# Patient Record
Sex: MALE | Race: OTHER | NOT HISPANIC OR LATINO
[De-identification: names, ages, dates, MRNs, and addresses within clinical notes are randomized per-mention and may not be internally consistent; named-entity substitution may affect disease eponyms.]

---

## 2018-05-07 ENCOUNTER — APPOINTMENT (OUTPATIENT)
Dept: UROLOGY | Facility: CLINIC | Age: 48
End: 2018-05-07
Payer: COMMERCIAL

## 2018-05-07 VITALS
DIASTOLIC BLOOD PRESSURE: 105 MMHG | SYSTOLIC BLOOD PRESSURE: 159 MMHG | RESPIRATION RATE: 17 BRPM | HEIGHT: 73 IN | WEIGHT: 240 LBS | BODY MASS INDEX: 31.81 KG/M2 | HEART RATE: 94 BPM

## 2018-05-07 PROCEDURE — 99213 OFFICE O/P EST LOW 20 MIN: CPT

## 2018-05-08 LAB
APPEARANCE: CLEAR
BACTERIA: NEGATIVE
BILIRUBIN URINE: NEGATIVE
BLOOD URINE: ABNORMAL
COLOR: YELLOW
GLUCOSE QUALITATIVE U: NEGATIVE MG/DL
HYALINE CASTS: 7 /LPF
KETONES URINE: NEGATIVE
LEUKOCYTE ESTERASE URINE: NEGATIVE
MICROSCOPIC-UA: NORMAL
NITRITE URINE: NEGATIVE
PH URINE: 5.5
PROTEIN URINE: 300 MG/DL
RED BLOOD CELLS URINE: 2 /HPF
SPECIFIC GRAVITY URINE: 1.03
SQUAMOUS EPITHELIAL CELLS: 3 /HPF
UROBILINOGEN URINE: NEGATIVE MG/DL
WHITE BLOOD CELLS URINE: 3 /HPF

## 2018-05-18 ENCOUNTER — FORM ENCOUNTER (OUTPATIENT)
Age: 48
End: 2018-05-18

## 2018-05-19 ENCOUNTER — OUTPATIENT (OUTPATIENT)
Dept: OUTPATIENT SERVICES | Facility: HOSPITAL | Age: 48
LOS: 1 days | End: 2018-05-19
Payer: COMMERCIAL

## 2018-05-19 ENCOUNTER — APPOINTMENT (OUTPATIENT)
Dept: ULTRASOUND IMAGING | Facility: IMAGING CENTER | Age: 48
End: 2018-05-19
Payer: COMMERCIAL

## 2018-05-19 DIAGNOSIS — Z00.00 ENCOUNTER FOR GENERAL ADULT MEDICAL EXAMINATION WITHOUT ABNORMAL FINDINGS: ICD-10-CM

## 2018-05-19 DIAGNOSIS — N20.0 CALCULUS OF KIDNEY: ICD-10-CM

## 2018-05-19 DIAGNOSIS — N23 UNSPECIFIED RENAL COLIC: ICD-10-CM

## 2018-05-19 PROCEDURE — 76770 US EXAM ABDO BACK WALL COMP: CPT | Mod: 26

## 2018-05-19 PROCEDURE — 76770 US EXAM ABDO BACK WALL COMP: CPT

## 2020-03-12 ENCOUNTER — OUTPATIENT (OUTPATIENT)
Dept: OUTPATIENT SERVICES | Facility: HOSPITAL | Age: 50
LOS: 1 days | End: 2020-03-12
Payer: SELF-PAY

## 2020-03-12 ENCOUNTER — APPOINTMENT (OUTPATIENT)
Dept: UROLOGY | Facility: CLINIC | Age: 50
End: 2020-03-12
Payer: SELF-PAY

## 2020-03-12 VITALS
SYSTOLIC BLOOD PRESSURE: 188 MMHG | HEART RATE: 98 BPM | DIASTOLIC BLOOD PRESSURE: 109 MMHG | TEMPERATURE: 98 F | RESPIRATION RATE: 16 BRPM

## 2020-03-12 DIAGNOSIS — N52.9 MALE ERECTILE DYSFUNCTION, UNSPECIFIED: ICD-10-CM

## 2020-03-12 DIAGNOSIS — Z00.00 ENCOUNTER FOR GENERAL ADULT MEDICAL EXAMINATION W/OUT ABNORMAL FINDINGS: ICD-10-CM

## 2020-03-12 DIAGNOSIS — N20.0 CALCULUS OF KIDNEY: ICD-10-CM

## 2020-03-12 DIAGNOSIS — R80.1 PERSISTENT PROTEINURIA, UNSPECIFIED: ICD-10-CM

## 2020-03-12 DIAGNOSIS — N23 UNSPECIFIED RENAL COLIC: ICD-10-CM

## 2020-03-12 LAB
ANION GAP SERPL CALC-SCNC: 15 MMOL/L
APPEARANCE: CLEAR
BACTERIA: NEGATIVE
BILIRUBIN URINE: NEGATIVE
BLOOD URINE: ABNORMAL
BUN SERPL-MCNC: 17 MG/DL
CALCIUM OXALATE CRYSTALS: ABNORMAL
CALCIUM SERPL-MCNC: 10.1 MG/DL
CHLORIDE SERPL-SCNC: 100 MMOL/L
CO2 SERPL-SCNC: 26 MMOL/L
COLOR: YELLOW
CREAT SERPL-MCNC: 1 MG/DL
GLUCOSE QUALITATIVE U: NEGATIVE
GLUCOSE SERPL-MCNC: 135 MG/DL
HYALINE CASTS: 1 /LPF
KETONES URINE: NEGATIVE
LEUKOCYTE ESTERASE URINE: NEGATIVE
MICROSCOPIC-UA: NORMAL
NITRITE URINE: NEGATIVE
PH URINE: 6
POTASSIUM SERPL-SCNC: 5 MMOL/L
PROTEIN URINE: ABNORMAL
PSA FREE FLD-MCNC: 23 %
PSA FREE SERPL-MCNC: 0.22 NG/ML
PSA SERPL-MCNC: 0.93 NG/ML
RED BLOOD CELLS URINE: 1 /HPF
SODIUM SERPL-SCNC: 141 MMOL/L
SPECIFIC GRAVITY URINE: 1.02
SQUAMOUS EPITHELIAL CELLS: 1 /HPF
URINE COMMENTS: NORMAL
UROBILINOGEN URINE: NORMAL
WHITE BLOOD CELLS URINE: 2 /HPF

## 2020-03-12 PROCEDURE — 76775 US EXAM ABDO BACK WALL LIM: CPT

## 2020-03-12 PROCEDURE — 76775 US EXAM ABDO BACK WALL LIM: CPT | Mod: 26

## 2020-03-12 PROCEDURE — 99214 OFFICE O/P EST MOD 30 MIN: CPT | Mod: 25

## 2020-03-13 NOTE — LETTER BODY
[FreeTextEntry1] : Ronnie Alcantara MD\par [address] [place] [address],[place] [address]\par [phone] \par [phone]\par \par Dear Dr. Alcantara,\par \par Reason for Visit: Uric acid kidney stones. Left flank pain.\par \par This is a 49 year-old gentleman with history of uric acid kidney stones. His previous renal ultrasound demonstrated a mild left renal fullness without evidence of renal calculi. His CT scan in 2016 demonstrated no residual renal or ureteral calculi. The patient returns today for follow up, last seen 2 years ago. Since his last visit, reports left flank pain. He continues to take potassium citrate as directed without any difficulties. All other review of systems were negative. The past medical history, family history and social history are unchanged. All other review of systems are negative. Patient denies any changes in medications. Medication list was reconciled.\par \par On examination, the patient is a healthy-appearing gentleman in no acute distress. He is alert and oriented follows commands. He has normal mood and affect. He is normocephalic. Neck is supple. Respirations are unlabored. His abdomen is soft and nontender. Bladder is nonpalpable. No CVA tenderness. Neurologically he is grossly intact. No peripheral edema. Skin without gross abnormality.\par \par His urinalysis in 2018 demonstrated persistent proteinuria, 300 mg/dL.\par \par I personally reviewed ultrasound images with the patient today. Images demonstrated both kidneys are normal in size and echogenicity without obvious stones,  hydronephrosis or solid masses visualized. \par \par Assessment: Uric acid stones. Proteinuria.\par \par I counseled the patient. Patient's ultrasound today was unremarkable for any stones. I reassured the patient as his stones have not recurred. Given his history of uric acid stones, I asked the he continue taking potassium citrate regularly. In terms of his proteinuria, I recommend he follow up obtain 24 hour urinalysis to evaluate protein levels. Patient understands that if he develops gross hematuria or any urinary discomfort, he will contact me for further evaluation. Risks and alternatives were discussed. I answered the patient's questions. The patient will follow up as directed and will contact me with any questions or concerns. Thank you for the opportunity to participate in the care of this patient. I'll keep you updated on his progress.\par \par Plan: Continue potassium citrate. 24 hour urine, protein total. Follow up in 1 year.

## 2020-03-13 NOTE — ADDENDUM
[FreeTextEntry1] : Entered by Jayesh Reyna, acting as scribe for Dr. Jamar Bernard.\par \par The documentation recorded by the scribe accurately reflects the service I personally performed and the decisions made by me.

## 2020-03-13 NOTE — LETTER BODY
[FreeTextEntry1] : Ronnie Alcantara MD\par [address] [place] [address],[place] [address]\par [phone] \par [phone]\par \par Dear Dr. Alcantara,\par \par Reason for Visit: Uric acid kidney stones. Left flank pain.\par \par This is a 49 year-old gentleman with history of uric acid kidney stones. His previous renal ultrasound demonstrated a mild left renal fullness without evidence of renal calculi. His CT scan in 2016 demonstrated no residual renal or ureteral calculi. The patient returns today for follow up, last seen 2 years ago. Since his last visit, reports left flank pain. He continues to take potassium citrate as directed without any difficulties. All other review of systems were negative. The past medical history, family history and social history are unchanged. All other review of systems are negative. Patient denies any changes in medications. Medication list was reconciled.\par \par On examination, the patient is a healthy-appearing gentleman in no acute distress. He is alert and oriented follows commands. He has normal mood and affect. He is normocephalic. Neck is supple. Respirations are unlabored. His abdomen is soft and nontender. Bladder is nonpalpable. No CVA tenderness. Neurologically he is grossly intact. No peripheral edema. Skin without gross abnormality.\par \par His urinalysis in 2018 demonstrated persistent proteinuria, 300 mg/dL.\par \par I personally reviewed ultrasound images with the patient today. Images demonstrated both kidneys are normal in size and echogenicity without obvious stones,  hydronephrosis or solid masses visualized. \par \par Assessment: Uric acid stones. Proteinuria.\par \par I counseled the patient. Patient's ultrasound today was unremarkable for any stones. I reassured the patient as his stones have not recurred. Given his history of uric acid stones, I asked the he continue taking potassium citrate regularly. In terms of his proteinuria, I recommend he follow up obtain 24 hour urinalysis to evaluate protein levels. Patient understands that if he develops gross hematuria or any urinary discomfort, he will contact me for further evaluation. Risks and alternatives were discussed. I answered the patient's questions. The patient will follow up as directed and will contact me with any questions or concerns. Thank you for the opportunity to participate in the care of this patient. I'll keep you updated on his progress.\par \par Plan: Continue potassium citrate. 24 hour urine, protein total. Follow up in 1 year.

## 2020-03-13 NOTE — LETTER BODY
[FreeTextEntry1] : Ronnie Alcantara MD\par [address] [place] [address],[place] [address]\par [phone] \par [phone]\par \par Dear Dr. Alcantara,\par \par Reason for Visit: Uric acid kidney stones. Left flank pain.\par \par This is a 49 year-old gentleman with history of uric acid kidney stones. His previous renal ultrasound demonstrated a mild left renal fullness without evidence of renal calculi. His CT scan in 2016 demonstrated no residual renal or ureteral calculi. The patient returns today for follow up, last seen 2 years ago. Since his last visit, reports left flank pain. He continues to take potassium citrate as directed without any difficulties. All other review of systems were negative. The past medical history, family history and social history are unchanged. All other review of systems are negative. Patient denies any changes in medications. Medication list was reconciled.\par \par On examination, the patient is a healthy-appearing gentleman in no acute distress. He is alert and oriented follows commands. He has normal mood and affect. He is normocephalic. Neck is supple. Respirations are unlabored. His abdomen is soft and nontender. Bladder is nonpalpable. No CVA tenderness. Neurologically he is grossly intact. No peripheral edema. Skin without gross abnormality.\par \par His urinalysis in 2018 demonstrated persistent proteinuria, 300 mg/dL.\par \par I personally reviewed ultrasound images with the patient today. Images demonstrated both kidneys are normal in size and echogenicity without obvious stones,  hydronephrosis or solid masses visualized. \par \par Assessment: Uric acid stones. Proteinuria.\par \par I counseled the patient. Patient's ultrasound today was unremarkable for any stones. I reassured the patient as his stones have not recurred. Given his history of uric acid stones, I asked the he continue taking potassium citrate regularly. In terms of his proteinuria, I recommend he follow up obtain 24 hour urinalysis to evaluate protein levels. Patient understands that if he develops gross hematuria or any urinary discomfort, he will contact me for further evaluation. Risks and alternatives were discussed. I answered the patient's questions. The patient will follow up as directed and will contact me with any questions or concerns. Thank you for the opportunity to participate in the care of this patient. I'll keep you updated on his progress.\par \par Plan: Continue potassium citrate. 24 hour urine, protein total. Follow up in 1 year.

## 2020-03-19 DIAGNOSIS — R80.1 PERSISTENT PROTEINURIA, UNSPECIFIED: ICD-10-CM

## 2020-03-19 DIAGNOSIS — N20.0 CALCULUS OF KIDNEY: ICD-10-CM

## 2020-03-19 DIAGNOSIS — N23 UNSPECIFIED RENAL COLIC: ICD-10-CM

## 2020-03-19 DIAGNOSIS — N52.9 MALE ERECTILE DYSFUNCTION, UNSPECIFIED: ICD-10-CM
